# Patient Record
Sex: FEMALE | Race: WHITE | URBAN - METROPOLITAN AREA
[De-identification: names, ages, dates, MRNs, and addresses within clinical notes are randomized per-mention and may not be internally consistent; named-entity substitution may affect disease eponyms.]

---

## 2021-09-20 ENCOUNTER — NURSE TRIAGE (OUTPATIENT)
Dept: OTHER | Facility: OTHER | Age: 31
End: 2021-09-20

## 2021-09-21 NOTE — TELEPHONE ENCOUNTER
Regarding: Travel Swab  ----- Message from [facility] sent at 9/20/2021  7:03 PM EDT -----  "I need a covid swab for a work training I need to go to "

## 2022-11-04 ENCOUNTER — ULTRASOUND (OUTPATIENT)
Dept: OBGYN CLINIC | Facility: MEDICAL CENTER | Age: 32
End: 2022-11-04

## 2022-11-04 VITALS
WEIGHT: 165.4 LBS | BODY MASS INDEX: 28.24 KG/M2 | DIASTOLIC BLOOD PRESSURE: 72 MMHG | SYSTOLIC BLOOD PRESSURE: 124 MMHG | HEIGHT: 64 IN

## 2022-11-04 DIAGNOSIS — Z98.891 HISTORY OF 3 CESAREAN SECTIONS: ICD-10-CM

## 2022-11-04 DIAGNOSIS — Q51.28 UTERUS DIDELPHYS IN PREGNANCY, FIRST TRIMESTER: ICD-10-CM

## 2022-11-04 DIAGNOSIS — O34.591 UTERUS DIDELPHYS IN PREGNANCY, FIRST TRIMESTER: ICD-10-CM

## 2022-11-04 DIAGNOSIS — N91.1 SECONDARY AMENORRHEA: Primary | ICD-10-CM

## 2022-11-04 NOTE — PROGRESS NOTES
Assessment/Plan:      28 y o  [id number] at  8 3/7 wga with RYLEE of 22 by US today  Heart tones WNL  Pt to follow up for OB intake  She is not taking any teratogenic medications  Nausea/vomiting is minimal  Counseled to start prenatal vitamin  Didelphys uterus w/ h/o  x 3  Subjective:      Patient ID:      28 y o  [id number] -    Patient is here for an early ultrasound  This was a planned pregnancy  Denies bleeding or cramping  Has some nausea  LMP: unknown  RYLEE: 23   GA: 9W4D           Review of Systems   Constitutional: Negative  Respiratory: Negative  Genitourinary: Negative  Psychiatric/Behavioral: Negative  Objective:      Physical Exam   Constitutional: She appears well-nourished  Cardiovascular: Normal rate  Pulmonary/Chest: Effort normal           Transvaginal US shows a live fetus with a heart beat of   Crown-rump length measures 2 55 cm, consistent with 9 2/7 wga  A yolk sac is visible within the gestational sac   Appears to be in left uterus

## 2022-11-17 ENCOUNTER — INITIAL PRENATAL (OUTPATIENT)
Dept: OBGYN CLINIC | Facility: CLINIC | Age: 32
End: 2022-11-17

## 2022-11-17 DIAGNOSIS — Z34.81 PRENATAL CARE, SUBSEQUENT PREGNANCY, FIRST TRIMESTER: Primary | ICD-10-CM

## 2022-11-17 NOTE — PROGRESS NOTES
OB INTAKE INTERVIEW  Patient is 28 y o  who presents for OB intake at 11-3 wks  She is accompanied by: phone interview  The father of her baby Gianfranco Noriega) is involved in the pregnancy and is supportive    Last Menstrual Period: 22  Ultrasound: Measured 9 weeks 2 days on 22  Estimated Date of Delivery: 2023 via LMP     Signs/Symptoms of Pregnancy  Current pregnancy symptoms: N & V  Constipation no  Headaches no  Cramping/spotting no  PICA cravings no    Diabetes-    If patient has 1 or more, please order early 1 hour GTT  History of GDM no  BMI >35 no  History of PCOS or current metformin use no  History of LGA/macrosomic infant (4000g/9lbs) no    If patient has 2 or more, please order early 1 hour GTT  BMI>30 no  AMA no  First degree relative with type 2 diabetes no  History of chronic HTN, hyperlipidemia, elevated A1C no  High risk race (, , ,  or ) no    Hypertension- if you answer yes, please order preeclampsia labs (cbc, comprehensive metabolic panel, urine protein creatinine ratio, uric acid)  History of of chronic HTN no  History of gestational HTN no  History of preeclampsia, eclampsia, or HELLP syndrome no  History of diabetes no  History of lupus, autoimmune disease, kidney disease no    Thyroid- if yes order TSH with reflex T4  History of thyroid disease no    Bleeding Disorder or Hx of DVT-patient or first degree relative with history of  Order the following if not done previously     (Factor V, antithrombin III, prothrombin gene mutation, protein C and S Ag, lupus anticoagulant, anticardiolipin, beta-2 glycoprotein)   no    OB/GYN-  History of abnormal pap smear no  History of HPV no  History of Herpes/HSV no  History of other STI (gonorrhea, chlamydia, trich) no  History of prior  no  History of prior  YES  History of  delivery prior to 36 weeks 6 days YES  History of blood transfusion no  Ok for blood transfusion yes    Substance screening- if yes outside of tobacco for her or anyone in her home-order urine drug screen  History of tobacco use YES  Currently using tobacco no  Currently using alcohol no  Presently using drugs no  Past drug use  no  IV drug use-If yes add Hep C antibody to labs no  Partner drug use no  Parent/Family drug use no    MRSA Screening-   Does the pt have a hx of MRSA? no  If yes- please follow MRSA protocol and obtain a nasal swab for MRSA culture    Immunizations:  Influenza vaccine given this season no  Discussed Tdap vaccine yes  Discussed COVID Vaccine yes    Genetic/MFM-  Do you or your partner have a history of any of the following in yourselves or first degree relatives? Cystic fibrosis no  Spinal muscular atrophy no  Hemoglobinopathy/Sickle Cell/Thalassemia no  Fragile X Intellectual Disability no    If yes, discuss carrier screening and recommend consultation with [facility]/genetic counseling  If no, discuss option for carrier screening and/or genetic testing with Nuchal Ultras[facility]  Patient interested yes  Appointment at [facility] made yes    Interview education  [facility] Pregnancy Essentials Book reviewed and discussed yes    Nurse/Family Partnership- patient may qualify no; referral placed no    Prenatal lab work scripts yes  Extra labs ordered:  no    The patient has a history now or in prior pregnancy notable for:   3 prior C/S  2 term, 1  at 33-3 -(IUGR),  Didelphys Uterus,  BMI 28 6  Prior deliveries at [facility]  Details that I feel the provider should be aware of: Pt denies receiving a flu vaccine or Covid Vaccines   Discussed importance of receiving both  PN1 visit scheduled  The patient was oriented to our practice, reviewed delivering physicians and [facility] for Delivery  All questions were answered  PN phone interview completed  Pt happy with pregnancy  PN bldwk ordered in epic    Encouraged pt to have bldwk drawn prior to PN1 visit, scheduled for 12/8/22  Referral was entered for [facility]- pt has appts scheduled for 12/1/22 & 1/24/23  Advised to call with any further questions/concerns      Interviewed by: Charanjit Hamilton RN, [address] [place] [address]

## 2022-12-15 PROBLEM — Z3A.15 15 WEEKS GESTATION OF PREGNANCY: Status: ACTIVE | Noted: 2022-12-15

## 2022-12-15 PROBLEM — O34.219 PREVIOUS CESAREAN DELIVERY, ANTEPARTUM: Status: ACTIVE | Noted: 2022-11-04

## 2022-12-15 PROBLEM — O09.219 PREVIOUS PRETERM DELIVERY, ANTEPARTUM: Status: ACTIVE | Noted: 2022-12-15

## 2022-12-15 NOTE — PROGRESS NOTES
Prenatal Visit  Subjective:   Rashid Robin is a 28 y o  female  She is a [id number] here for initial PN visit/New OB exam    She is reporting the following pregnancy related complaints:  • NO N/V  • Denies cramping or VB      The following portions of the patient's history were reviewed and updated as appropriate: allergies, current medications, past family history, past medical history, past social history, past surgical history, and problem list   Genetic Family hx: unremarkle  Diabetes risk Factors:   none     Hypertension Risk Factors:   Pertinent positive: none      Objective:  Patient's last menstrual period was 2022 (exact date)  /82 (BP Location: Left arm, Patient Position: Sitting, Cuff Size: Standard)   Ht 5' 3 5" (1 613 m)   Wt 75 1 kg (165 lb 9 6 oz)   LMP 2022 (Exact Date)   BMI 28 87 kg/m²   Pregravid Weight/BMI: 77 1 kg (170 lb) (BMI 29 64)      OBGyn Exam- see prenatal physical form    Assessment & Plan:    1  Previous  delivery, antepartum  Assessment & Plan:  Previous C/S x3  Planning repeat C/S at 39 wks    Orders:  -     POCT urine dip    2  Previous  delivery, antepartum  Assessment & Plan:  Pt with prior PTD at 33w3d for IUGR  MFM consult   3  Screen for STD (sexually transmitted disease)  -     Chlamydia/GC amplified DNA by PCR    4  Screening for cervical cancer  -     Liquid-based pap, screening    5  15 weeks gestation of pregnancy  Assessment & Plan:  She is a [id number] at 15w4d   Viability previously established  + FHR today  New OB Exam completed -and Essentially normal- Has 2 cervix due to didelphic uterus  Pap is indicated x2 and gonorrhea/chlamydia cultures collected  See other A&P for risk factors/identified    I reviewed the expected course of the pregnancy with visits, labs and additional testing  The patient has not obtained her prenatal labs    Genetic screening/testing options again reviewed and she elects for average risk NIPT     I have reviewed the maternal as well as infant benefits of breastfeeding with the patient  The patient currently plans to bottle feed  I have reviewed proper nutrition in pregnancy as well as exercise and safe medications that can be used for various common symptoms in pregnancy  I reviewed the reasons to call in the first trimester - namely vaginal bleeding, severe nausea and vomiting, severe pelvic pain, fevers or pain/burning with urination  She was already previously referred to [facility] for NT US and will scheduled for level 2 G&A for 20-21 wks after that appt  I recommended that the patient receive the covid vaccine if not already done and to receive the flu vaccine during flu season  AFP order given today  All questions were answered to her satisfaction      Orders:  -     Alpha fetoprotein, maternal; Future        ALCIDES Haas  12/16/2022

## 2022-12-16 ENCOUNTER — INITIAL PRENATAL (OUTPATIENT)
Dept: OBGYN CLINIC | Facility: MEDICAL CENTER | Age: 32
End: 2022-12-16

## 2022-12-16 VITALS
SYSTOLIC BLOOD PRESSURE: 132 MMHG | WEIGHT: 165.6 LBS | DIASTOLIC BLOOD PRESSURE: 82 MMHG | BODY MASS INDEX: 28.27 KG/M2 | HEIGHT: 64 IN

## 2022-12-16 DIAGNOSIS — Z3A.15 15 WEEKS GESTATION OF PREGNANCY: ICD-10-CM

## 2022-12-16 DIAGNOSIS — O09.219 PREVIOUS PRETERM DELIVERY, ANTEPARTUM: ICD-10-CM

## 2022-12-16 DIAGNOSIS — Z11.3 SCREEN FOR STD (SEXUALLY TRANSMITTED DISEASE): ICD-10-CM

## 2022-12-16 DIAGNOSIS — Z12.4 SCREENING FOR CERVICAL CANCER: ICD-10-CM

## 2022-12-16 DIAGNOSIS — O34.592 UTERUS DIDELPHYS IN PREGNANCY, SECOND TRIMESTER: ICD-10-CM

## 2022-12-16 DIAGNOSIS — Q51.28 UTERUS DIDELPHYS IN PREGNANCY, SECOND TRIMESTER: ICD-10-CM

## 2022-12-16 DIAGNOSIS — O34.219 PREVIOUS CESAREAN DELIVERY, ANTEPARTUM: Primary | ICD-10-CM

## 2022-12-16 LAB
SL AMB  POCT GLUCOSE, UA: ABNORMAL
SL AMB POCT URINE PROTEIN: ABNORMAL

## 2022-12-16 NOTE — PROGRESS NOTES
Initial Prenatal Visit   GA 15+2/7  [id number]  Pap - 10/10/17  Patient denies any lof, vb or ctx  Patient has not felt FM yet  Patient urine is trace pro/neg glu   Blue folder given/reviewed today   Patient has no concerns today

## 2022-12-16 NOTE — ASSESSMENT & PLAN NOTE
She is a [id number] at 15w4d   Viability previously established  + FHR today  New OB Exam completed -and Essentially normal- Has 2 cervix due to didelphic uterus  Pap is indicated x2 and gonorrhea/chlamydia cultures collected  See other A&P for risk factors/identified    I reviewed the expected course of the pregnancy with visits, labs and additional testing  The patient has not obtained her prenatal labs  Genetic screening/testing options again reviewed and she elects for average risk NIPT     I have reviewed the maternal as well as infant benefits of breastfeeding with the patient  The patient currently plans to bottle feed  I have reviewed proper nutrition in pregnancy as well as exercise and safe medications that can be used for various common symptoms in pregnancy  I reviewed the reasons to call in the first trimester - namely vaginal bleeding, severe nausea and vomiting, severe pelvic pain, fevers or pain/burning with urination  She was already previously referred to [facility] for NT US and will scheduled for level 2 G&A for 20-21 wks after that appt  I recommended that the patient receive the covid vaccine if not already done and to receive the flu vaccine during flu season  AFP order given today  All questions were answered to her satisfaction

## 2022-12-19 LAB
HPV HR 12 DNA CVX QL NAA+PROBE: NEGATIVE
HPV16 DNA CVX QL NAA+PROBE: NEGATIVE
HPV18 DNA CVX QL NAA+PROBE: POSITIVE

## 2022-12-20 PROBLEM — A63.0 HPV (HUMAN PAPILLOMA VIRUS) ANOGENITAL INFECTION: Status: ACTIVE | Noted: 2022-12-20

## 2022-12-20 LAB
C TRACH DNA SPEC QL NAA+PROBE: NEGATIVE
N GONORRHOEA DNA SPEC QL NAA+PROBE: NEGATIVE

## 2022-12-23 LAB
LAB AP GYN PRIMARY INTERPRETATION: NORMAL
Lab: NORMAL

## 2022-12-27 PROBLEM — Q51.28 DIDELPHIC UTERUS IN PREGNANCY: Status: ACTIVE | Noted: 2022-12-27

## 2022-12-27 PROBLEM — O34.599 DIDELPHIC UTERUS IN PREGNANCY: Status: ACTIVE | Noted: 2022-12-27

## 2022-12-27 LAB
LAB AP GYN PRIMARY INTERPRETATION: ABNORMAL
Lab: ABNORMAL
PATH INTERP SPEC-IMP: ABNORMAL

## 2022-12-28 ENCOUNTER — TELEPHONE (OUTPATIENT)
Dept: OBGYN CLINIC | Facility: CLINIC | Age: 32
End: 2022-12-28

## 2022-12-28 NOTE — TELEPHONE ENCOUNTER
Pt needs colp and  added another 20 min to your appt - hopefully you can do colp at appt   Please check - if not good - needs to be r/s

## 2022-12-28 NOTE — TELEPHONE ENCOUNTER
----- Message from Tariq Centeno [place] sent at 12/27/2022  3:52 PM EST -----  Didelphic uterus/cervix  Abnormal right cervix sample  ASCUS with + Type 18 HPV  Needs OB colpo   Please notify and schedule her

## 2023-01-13 PROBLEM — Z3A.19 19 WEEKS GESTATION OF PREGNANCY: Status: ACTIVE | Noted: 2022-12-15

## 2023-01-25 PROBLEM — O09.292 HISTORY OF IUGR (INTRAUTERINE GROWTH RETARDATION) AND STILLBIRTH, CURRENTLY PREGNANT, SECOND TRIMESTER: Status: ACTIVE | Noted: 2023-01-25

## 2023-01-25 NOTE — PATIENT INSTRUCTIONS
Thank you for choosing us for your  care today  If you have any questions about your ultrasound or care, please do not hesitate to contact us or your primary obstetrician  Some general instructions for your pregnancy are:    Exercise: Aim for 22 minutes per day (150 minutes per week) of regular exercise  Walking is great! Nutrition: Choose healthy sources of calcium, iron, and protein  Learn about Preeclampsia: preeclampsia is a common, serious high blood pressure complication in pregnancy  A blood pressure of 326KVTL (systolic or top number) or 53NDKI (diastolic or bottom number) is not normal and needs evaluation by your doctor  Aspirin is sometimes prescribed in early pregnancy to prevent preeclampsia in women with risk factors - ask your obstetrician if you should be on this medication  For more resources, visit:  https://mothertobaby org/fact-sheets/low-dose-aspirin/  PlannerBlog com cy  https://www highriskpregnancyinfo org/preeclampsia  If you smoke, try to reduce how many cigarettes you smoke or try to quit completely  Do not vape  Other warning signs to watch out for in pregnancy or postpartum: chest pain, obstructed breathing or shortness of breath, seizures, thoughts of hurting yourself or your baby, bleeding, a painful or swollen leg, fever, or headache (see [facility] POST-BIRTH Warning Signs campaign)  If these happen call 911  Itching is also not normal in pregnancy and if you experience this, especially over your hands and feet, potentially worse at night, notify your doctors

## 2023-01-26 ENCOUNTER — ROUTINE PRENATAL (OUTPATIENT)
Dept: PERINATAL CARE | Facility: OTHER | Age: 33
End: 2023-01-26

## 2023-01-26 VITALS
BODY MASS INDEX: 30.79 KG/M2 | HEIGHT: 63 IN | WEIGHT: 173.8 LBS | SYSTOLIC BLOOD PRESSURE: 120 MMHG | DIASTOLIC BLOOD PRESSURE: 70 MMHG | HEART RATE: 90 BPM

## 2023-01-26 DIAGNOSIS — Z36.3 ENCOUNTER FOR ANTENATAL SCREENING FOR MALFORMATIONS: ICD-10-CM

## 2023-01-26 DIAGNOSIS — N91.1 SECONDARY AMENORRHEA: ICD-10-CM

## 2023-01-26 DIAGNOSIS — Z36.86 ENCOUNTER FOR ANTENATAL SCREENING FOR CERVICAL LENGTH: ICD-10-CM

## 2023-01-26 DIAGNOSIS — Q51.28 PREGNANCY WITH CONGENITAL DUPLICATION OF UTERUS IN SECOND TRIMESTER: Primary | ICD-10-CM

## 2023-01-26 DIAGNOSIS — O34.592 PREGNANCY WITH CONGENITAL DUPLICATION OF UTERUS IN SECOND TRIMESTER: Primary | ICD-10-CM

## 2023-01-26 DIAGNOSIS — Z3A.21 21 WEEKS GESTATION OF PREGNANCY: ICD-10-CM

## 2023-01-26 DIAGNOSIS — O09.292 HISTORY OF IUGR (INTRAUTERINE GROWTH RETARDATION) AND STILLBIRTH, CURRENTLY PREGNANT, SECOND TRIMESTER: ICD-10-CM

## 2023-01-26 DIAGNOSIS — O34.219 PREVIOUS CESAREAN DELIVERY, ANTEPARTUM: ICD-10-CM

## 2023-01-26 NOTE — LETTER
2023     [address]  [address] [address]    Patient: Ute Mendez   YOB: 1990   Date of Visit: 2023       Dear Dr Zack Coon: Thank you for referring Dar Valerio to me for evaluation  Below are my notes for this consultation  If you have questions, please do not hesitate to call me  I look forward to following your patient along with you  Sincerely,        Patricia Tejeda MD        CC: No Recipients  Patricia Tejeda MD  2023  4:47 PM  Sign when Signing Visit  Stacey Bennett Md  [address] [facility],  [address] [facility]     Dear Dr Zack Coon     Thank you for requesting a  consultation on your patient Roma Manzo for the following indications: Fetal anatomical survey and she declined genetic screening  History  Past medical history: Uterine didelphys, migraines  Past surgical history:  section x3  Medications: Prenatal vitamins  Allergies to medications: None  Past obstetrical history:   2013 at 39 weeks and 2 days she required a low transverse  girl who was small for gestational age  2017 at 33 weeks and 3 days she required a low transverse  section for a 2 pound 15 ounce baby boy  During this pregnancy she fell and during the monitoring she was found to have a category 2 tracing and an US showed her babies growth was lagging by 5 weeks so she underwent steroids to help with lung maturity and delivery was planned 48 hours later  2018 at 39 weeks and 2 days she had a 6 pound 6 ounce baby girl by low-transverse  section  Social history: Denies substance use currently    She has a history of smoking in the past   First generation family history: No evidence for hypertension, diabetes, thrombosis or congenital anomalies reported    Ultrasound findings: Her ultrasound shows a fetus in the uterus with a septum and the fetus is lying to the left of the septum  The fetal growth is concordant with her dates  No malformations are detected  We were unable to complete the anatomical survey today secondary to fetal position  the majority of the placenta is on the posterior uterine wall with a small portion on the uterine septum  The placental cord insertion is difficult to see today secondary to fetal position and placental position  Transvaginal scan of her cervical length shows that the 2 cervixes are very close to 1 another and is difficult to tell which is the one that is associated with the left uterus so we measured both cervixes and they both are normal in length  The patient was informed of the findings and counseled about the limitations of the exam in detecting all forms of fetal congenital abnormalities  She does not report any vaginal bleeding or uterine cramping/contractions  She does feel fetal movement  Pelvic exam by myself shows that she has what feels like two cervixes immediately [id number]  The cervix appears wide with 2 dimples suggestive of 2 external os  Specific counseling was provided on the following problems:  1  Uterine malformations can be associated with IUGR,  labor and fetal malpresentation  With her history of prior pregnancies that were small for gestational age recommend serial growth scans starting at 28 weeks  2   Offered genetic screening again today which she declined  Follow up recommended:   Recommend a growth scan at 28 weeks  Pre visit time reviewing her records  15 minutes  Face to face time 20 minutes  Post visit time on documentation of note, updating her problem list, adding orders and prescriptions 20 minutes  Procedures that were completed today were charged separately  The level of decision making was moderate complexity      Caleb Ramirez MD

## 2023-01-26 NOTE — PROGRESS NOTES
Raymond Pham Md  [address] [id number]  [address] [facility],  [address] [facility]     Dear Dr Cristin Colón     Thank you for requesting a  consultation on your patient Roma Barrios for the following indications: Fetal anatomical survey and she declined genetic screening  History  Past medical history: Uterine didelphys, migraines  Past surgical history:  section x3  Medications: Prenatal vitamins  Allergies to medications: None  Past obstetrical history:   2013 at 39 weeks and 2 days she required a low transverse  girl who was small for gestational age  2017 at 33 weeks and 3 days she required a low transverse  section for a 2 pound 15 ounce baby boy  During this pregnancy she fell and during the monitoring she was found to have a category 2 tracing and an US showed her babies growth was lagging by 5 weeks so she underwent steroids to help with lung maturity and delivery was planned 48 hours later  2018 at 39 weeks and 2 days she had a 6 pound 6 ounce baby girl by low-transverse  section  Social history: Denies substance use currently  She has a history of smoking in the past   First generation family history: No evidence for hypertension, diabetes, thrombosis or congenital anomalies reported    Ultrasound findings: Her ultrasound shows a fetus in the uterus with a septum and the fetus is lying to the left of the septum  The fetal growth is concordant with her dates  No malformations are detected  We were unable to complete the anatomical survey today secondary to fetal position  the majority of the placenta is on the posterior uterine wall with a small portion on the uterine septum  The placental cord insertion is difficult to see today secondary to fetal position and placental position    Transvaginal scan of her cervical length shows that the 2 cervixes are very close to 1 another and is difficult to tell which is the one that is associated with the left uterus so we measured both cervixes and they both are normal in length  The patient was informed of the findings and counseled about the limitations of the exam in detecting all forms of fetal congenital abnormalities  She does not report any vaginal bleeding or uterine cramping/contractions  She does feel fetal movement  Pelvic exam by myself shows that she has what feels like two cervixes immediately [id number]  The cervix appears wide with 2 dimples suggestive of 2 external os  Specific counseling was provided on the following problems:  1  Uterine malformations can be associated with IUGR,  labor and fetal malpresentation  With her history of prior pregnancies that were small for gestational age recommend serial growth scans starting at 28 weeks  2   Offered genetic screening again today which she declined  Follow up recommended:   Recommend a growth scan at 28 weeks  Pre visit time reviewing her records  15 minutes  Face to face time 20 minutes  Post visit time on documentation of note, updating her problem list, adding orders and prescriptions 20 minutes  Procedures that were completed today were charged separately  The level of decision making was moderate complexity      Caleb Ramirez MD

## 2023-01-27 ENCOUNTER — ROUTINE PRENATAL (OUTPATIENT)
Dept: OBGYN CLINIC | Facility: MEDICAL CENTER | Age: 33
End: 2023-01-27

## 2023-01-27 ENCOUNTER — TELEPHONE (OUTPATIENT)
Dept: PERINATAL CARE | Facility: OTHER | Age: 33
End: 2023-01-27

## 2023-01-27 VITALS
WEIGHT: 171.4 LBS | HEIGHT: 63 IN | SYSTOLIC BLOOD PRESSURE: 120 MMHG | DIASTOLIC BLOOD PRESSURE: 70 MMHG | BODY MASS INDEX: 30.37 KG/M2

## 2023-01-27 DIAGNOSIS — A63.0 HPV (HUMAN PAPILLOMA VIRUS) ANOGENITAL INFECTION: ICD-10-CM

## 2023-01-27 DIAGNOSIS — O09.219 PREVIOUS PRETERM DELIVERY, ANTEPARTUM: ICD-10-CM

## 2023-01-27 DIAGNOSIS — Z3A.21 21 WEEKS GESTATION OF PREGNANCY: Primary | ICD-10-CM

## 2023-01-27 DIAGNOSIS — Q51.28 PREGNANCY WITH CONGENITAL DUPLICATION OF UTERUS IN SECOND TRIMESTER: ICD-10-CM

## 2023-01-27 DIAGNOSIS — O34.592 PREGNANCY WITH CONGENITAL DUPLICATION OF UTERUS IN SECOND TRIMESTER: ICD-10-CM

## 2023-01-27 LAB
SL AMB  POCT GLUCOSE, UA: ABNORMAL
SL AMB POCT URINE PROTEIN: ABNORMAL

## 2023-01-27 NOTE — PROGRESS NOTES
Prenatal Visit  Subjective:   Lisa Saleem is a 28 y o  [id number] 21w4d here for Routine Prenatal Visit (Pt is here for routine PN visit /No lof,VB,Ctx,/+FTM/Urine-Trace protein, Neg glucose /No questions or concerns for today //)    She denies any cramping, LOF/unusual discharge or VB  She has noted fetal movement  I have reviewed the patient's Level II u/s which was normal     Pregnancy: Fink    Objective:  Vitals:    23 1111   BP: 120/70     Pregravid Weight/BMI: 77 1 kg (170 lb) (BMI 30 12)  Current Weight: 77 7 kg (171 lb 6 4 oz)   Total Weight Gain: 0 635 kg (1 lb 6 4 oz)     Fetal Heart Rate: 150    OBGyn Exam  Physical Exam  Fetal Heart Rate: 150   General: Not in acute distress and appearing well nourished and well groomed  Genitourinary:          Pelvic exam exam deferred   Cardiovascular:   Rate and Rhythm: Normal rate  Pulmonary:    Effort: normal, not labored  Abdominal:  Abdomen is soft and gravid    Musculoskeletal: Active movement of all extremities, no gross limitations of ROM     Edema:None noted  Neurological:   Mental Status: She is alert and oriented to person, place, and time  Skin: General: Skin is warm and dry  Psychiatric:    Mood and Affect: Mood normal       Behavior: Behavior normal    Assessment & Plan:    1  21 weeks gestation of pregnancy  Assessment & Plan:    21w4d  Doing well  Starting to feel fetal movement  Level 2 US WNL    Education provided on the pros/cons breastfeeding vs formula feeding; in particular the effects of early supplementation and education provided in AVS as well     Questions have been answered to her satisfaction  Orders:  -     POCT urine dip    2  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

## 2023-01-27 NOTE — ASSESSMENT & PLAN NOTE
21w4d  Doing well  Starting to feel fetal movement  Level 2 US WNL    Education provided on the pros/cons breastfeeding vs formula feeding; in particular the effects of early supplementation and education provided in AVS as well     Questions have been answered to her satisfaction

## 2023-01-27 NOTE — TELEPHONE ENCOUNTER
LVM 1/27 at 0905 to sched  # provided to patient    ----- Message from Ricky Francse MD sent at 1/26/2023  4:48 PM EST -----  Please call to schedule this patient for a 28 week growth scan       Lisa Ryan

## 2023-02-09 ENCOUNTER — ROUTINE PRENATAL (OUTPATIENT)
Dept: OBGYN CLINIC | Facility: MEDICAL CENTER | Age: 33
End: 2023-02-09

## 2023-02-09 VITALS
HEART RATE: 74 BPM | WEIGHT: 176 LBS | DIASTOLIC BLOOD PRESSURE: 74 MMHG | SYSTOLIC BLOOD PRESSURE: 128 MMHG | BODY MASS INDEX: 31.18 KG/M2

## 2023-02-09 DIAGNOSIS — Z34.82 PRENATAL CARE, SUBSEQUENT PREGNANCY, SECOND TRIMESTER: ICD-10-CM

## 2023-02-09 DIAGNOSIS — Z3A.23 23 WEEKS GESTATION OF PREGNANCY: Primary | ICD-10-CM

## 2023-02-09 DIAGNOSIS — O09.292 HISTORY OF IUGR (INTRAUTERINE GROWTH RETARDATION) AND STILLBIRTH, CURRENTLY PREGNANT, SECOND TRIMESTER: ICD-10-CM

## 2023-02-09 DIAGNOSIS — O34.592 PREGNANCY WITH CONGENITAL DUPLICATION OF UTERUS IN SECOND TRIMESTER: ICD-10-CM

## 2023-02-09 DIAGNOSIS — Q51.28 PREGNANCY WITH CONGENITAL DUPLICATION OF UTERUS IN SECOND TRIMESTER: ICD-10-CM

## 2023-02-09 DIAGNOSIS — A63.0 HPV (HUMAN PAPILLOMA VIRUS) ANOGENITAL INFECTION: ICD-10-CM

## 2023-02-09 DIAGNOSIS — O34.219 PREVIOUS CESAREAN DELIVERY, ANTEPARTUM: ICD-10-CM

## 2023-02-09 LAB
SL AMB  POCT GLUCOSE, UA: NORMAL
SL AMB POCT URINE PROTEIN: NORMAL

## 2023-02-09 NOTE — ASSESSMENT & PLAN NOTE
Carley Hein  is a 28 y o  [id number] @23w3d who presents for routine prenatal visit  Denies OB complaints  NIPT/MASFP- declined  Level II - completed; 28 wk growth scan needed; pt aware to call to schedule this  28 week lab orders placed today; test timing reviewed    Good fetal movement  Denies contractions, cramping, leakage of fluid or vaginal bleeding  Reviewed PTL precautions and reasons to call

## 2023-02-09 NOTE — PROGRESS NOTES
Routine OB 23w3d  Overall feels well  Denies any loss of fluid, bleeding or  regular contractions  She confirms good fetal movement  Urine Dip NEG for glucose and NEG for protein  Concerns for today none to offer  Reviewed orders for 3rd trimester labs

## 2023-02-09 NOTE — PROGRESS NOTES
23 weeks gestation of pregnancy  Devin Ibarra  is a 28 y o  [id number] @23w3d who presents for routine prenatal visit  Denies OB complaints  NIPT/MASFP- declined  Level II - completed; 28 wk growth scan needed; pt aware to call to schedule this  28 week lab orders placed today; test timing reviewed    Good fetal movement  Denies contractions, cramping, leakage of fluid or vaginal bleeding  Reviewed PTL precautions and reasons to call          History of IUGR (intrauterine growth retardation) and stillbirth, currently pregnant, second trimester  28 wk then serial growth recommended by MFM  Pt advised to call MFM to schedule    HPV type 18  OB colpo scheduled for next week    Previous  delivery, antepartum  Hx CS x 3  Will need repeat    Didelphic uterus in pregnancy  28 wk then serial growth recommended by MF  Pt advised to call MFM to schedule

## 2023-02-14 ENCOUNTER — PROCEDURE VISIT (OUTPATIENT)
Dept: OBGYN CLINIC | Facility: MEDICAL CENTER | Age: 33
End: 2023-02-14

## 2023-02-14 VITALS
HEIGHT: 63 IN | DIASTOLIC BLOOD PRESSURE: 76 MMHG | BODY MASS INDEX: 31.57 KG/M2 | SYSTOLIC BLOOD PRESSURE: 132 MMHG | WEIGHT: 178.2 LBS

## 2023-02-14 DIAGNOSIS — R87.610 ASCUS WITH POSITIVE HIGH RISK HPV CERVICAL: Primary | ICD-10-CM

## 2023-02-14 DIAGNOSIS — R87.810 ASCUS WITH POSITIVE HIGH RISK HPV CERVICAL: Primary | ICD-10-CM

## 2023-02-14 NOTE — PROGRESS NOTES
Colposcopy     Date/Time 2/14/2023 3:16 PM     Universal Protocol   Consent: Verbal consent obtained  Risks and benefits: risks, benefits and alternatives were discussed  Consent given by: patient  Patient understanding: patient states understanding of the procedure being performed  Patient identity confirmed: verbally with patient       Performed by  Shanna Almaraz MD     Authorized by Shanna Almaraz MD        Pre-procedure details     Prepped with: acetic acid       Indication    ASC-US (HPV 18 positive)     Procedure Details   Procedure: Colposcopy w/ cervical biopsy and ECC      [place] speculum was placed in the vagina: yes      Under colposcopic examination the transition zone was seen in entirety: yes      Cervical biopsy performed with a cervical biopsy punch: yes      Monsel's solution was applied: yes      Biopsy(s): yes      Location:  7 o clock    Specimen to pathology: yes       Post-procedure      Findings: Mosaicism and White epithelium      Impression: Low grade cervical dysplasia      Patient tolerance of procedure: Tolerated well, no immediate complications     Comments       Known didelphys uterus, ASCUS, 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

## 2023-02-28 ENCOUNTER — NURSE TRIAGE (OUTPATIENT)
Dept: OTHER | Facility: OTHER | Age: 33
End: 2023-02-28

## 2023-02-28 ENCOUNTER — HOSPITAL ENCOUNTER (OUTPATIENT)
Facility: HOSPITAL | Age: 33
Discharge: HOME | End: 2023-02-28
Attending: OBSTETRICS & GYNECOLOGY | Admitting: OBSTETRICS & GYNECOLOGY

## 2023-02-28 VITALS
TEMPERATURE: 99.4 F | RESPIRATION RATE: 18 BRPM | SYSTOLIC BLOOD PRESSURE: 122 MMHG | OXYGEN SATURATION: 98 % | DIASTOLIC BLOOD PRESSURE: 73 MMHG | HEART RATE: 99 BPM

## 2023-02-28 PROBLEM — Z3A.26 26 WEEKS GESTATION OF PREGNANCY: Status: ACTIVE | Noted: 2022-12-15

## 2023-02-28 PROBLEM — N89.8 VAGINAL DISCHARGE: Status: ACTIVE | Noted: 2023-02-28

## 2023-02-28 NOTE — TELEPHONE ENCOUNTER
Reason for Disposition  • [1] Pregnant 24-36 weeks () AND [2] pinkish or brownish mucous discharge    Answer Assessment - Initial Assessment Questions  1  DISCHARGE: "Describe the discharge " (e g , white, yellow, green, gray, foamy, cottage cheese-like)     Brownish colored mucus ball   2  ODOR: "Is there a bad odor?"      Denies  3  ONSET: "When did the discharge begin?"      Just happened  a few mins ago  4  RASH: "Is there a rash in that area?" If Yes, ask: "Describe it " (e g , redness, blisters, sores, bumps)      Denies  5  ABDOMINAL PAIN: "Are you having any abdominal pain?" If Yes, ask: "What does it feel like?" (e g , crampy, dull, intermittent, constant)       Denies  ABDOMINAL PAIN SEVERITY: If present, ask: "How bad is it?"  (e g , Scale 1-10; mild, moderate, or severe)    - MILD (1-3): doesn't interfere with normal activities, abdomen soft and not tender to touch     - MODERATE (4-7): interferes with normal activities or awakens from sleep, tender to touch     - SEVERE (8-10): excruciating pain, doubled over, unable to do any normal activities      Denies  7  CAUSE: "What do you think is causing the discharge?"      Unsure  8  OTHER SYMPTOMS: "Do you have any other symptoms?" (e g , fever, itching, vaginal bleeding, pain with urination)      Denies  9  RYLEE: "What date are you expecting to deliver?"      06 6 23  10   PREGNANCY: "How many weeks pregnant are you?"    Protocols used: PREGNANCY - VAGINAL DISCHARGE-ADULT-

## 2023-02-28 NOTE — PROCEDURES
Carlos Alberto Destinee [facility] [id number] at 26w1d with an RYLEE of 6/5/2023, by Last Menstrual Period, was seen at [address] for the following procedure(s): $Procedure Type: US - Transvaginal]            Image failing to upload to note but present in media tab  Ultrasound Other  Fetal Presentation: Vertex  Cervical Length: 3 27  Funnel: No  Placenta Previa: No  Vasa Previa: No         Ultrasound Probe Disinfection    A transvaginal ultrasound was performed     Prior to use, disinfection was performed with High Level Disinfection Process (Trophon)      Negin Varghese MD  02/28/23  12:13 PM

## 2023-02-28 NOTE — TELEPHONE ENCOUNTER
Regarding: Mucus Ball  ----- Message from [facility] sent at 2/28/2023  6:24 AM EST -----  "I am 26w and I just went to the bathroom and had a huge mucus ball just come out   Should I go to the ER or can I wait to be seen?"

## 2023-02-28 NOTE — PROGRESS NOTES
L&D Triage Note - OB/GYN  Jean Cheung 28 y o  female MRN: [id number]  Unit/Bed#:  [facility]  Encounter: [id number]    Patient is seen by Isabell Santos is a 28 y o  [id number] at 26w1d here for vaginal discharge with a negative  labor work-up  PLAN  #1  Vaginal discharge   · Asymptomatic (no itching/burning/irritation/odor)  · Speculum exam and microscopy with normal findings  · Membrane status: no ferning or pooling and negative nitrazene   · Picture of discharge on media    #2  Pelvic Pressure    · Speculum exam and microscopy with normal findings  · CL 3 27 cm  · Left cervix (pregnancy in left uterine horn) 0/0/-4, firm and posterior  · Right cervix not palpable though definite vaginal septum palpable  · NST reactive  · No contractions noted on toco    Discharge instructions  · Patient instructed to call if experiencing worsening contractions, vaginal bleeding, loss of fluid or decreased fetal movement  · Will follow up with OBGYN on 2023     D/w Dr Lisa Lara  ______________    SUBJECTIVE    RYLEE: Estimated Date of Delivery: 23    HPI:  28 y o  [id number] 26w1d who reports that when she was wiping this morning she noticed abnormal vaginal discharge on her tissue  Of note patient had colposcopy with cervical biopsy and ECC 2 weeks ago for ASCUS and HPV 18 on the right cervix (she had known uterine didelphys with 2 cervices)  Since colposcopy patient, is reporting mild brown spotting but no gross hematuria or vaginal bleeding  Patient denies fever, contractions, dysuria, leakage of fluid or leakage of fluids  Patient reports feeling good fetal movement  Patient however is endorsing intermittent pelvic pressure       Her obstetrical history is significant for didelphic uterus, ASCUS and HPV 18     ROS:  Constitutional: Negative  Respiratory: Negative  Cardiovascular: Negative    Gastrointestinal: Negative    OBJECTIVE:  /73 (BP Location: Right arm)   Pulse 99   Temp 99 4 °F (37 4 °C) (Oral)   Resp 18   LMP 08/29/2022 (Exact Date)   SpO2 98%   There is no height or weight on file to calculate BMI  Physical Exam  Vitals and nursing note reviewed  Constitutional:       General: She is not in acute distress  Appearance: She is well-developed  HENT:      Head: Normocephalic and atraumatic  Eyes:      Conjunctiva/sclera: Conjunctivae normal    Cardiovascular:      Rate and Rhythm: Normal rate and regular rhythm  Heart sounds: No murmur heard  Pulmonary:      Effort: Pulmonary effort is normal  No respiratory distress  Breath sounds: Normal breath sounds  Abdominal:      General: There is no distension  Palpations: Abdomen is soft  Tenderness: There is no abdominal tenderness  There is no guarding  Musculoskeletal:         General: No swelling  Cervical back: Neck supple  Skin:     General: Skin is warm and dry  Capillary Refill: Capillary refill takes less than 2 seconds  Neurological:      Mental Status: She is alert  Psychiatric:         Mood and Affect: Mood normal          Speculum exam:  Normal external female genitalia  Cervix fully visualized and not visibly dilated  Physiologic discharge  No bleeding, pooling, abnormal discharge, or lesions noted    Microscopy:     Infection:   - neg clue cells    - neg  hyphae   - no trichomonads present    Membrane status   - no ferning   - no nitrazene   - no pooling     SVE:  0/0/-3, posterior, firm    FHT:  Baseline: 140 bpm  Variability: moderate  Accelerations: present  Decelerations: absent  NST reactive    TOCO:   Acontractile    IMAGING:       TVUS   Cervical length         - 3 27 cm         - 3 50 cm         - 3 60 cm   Presentation: 3 46    Labs: No results found for this or any previous visit (from the past 24 hour(s))        Yumiko Canseco MD  2/28/2023  10:46 AM

## 2023-02-28 NOTE — TELEPHONE ENCOUNTER
Pt called in stating she is 26 weeks pregnant with her 4th baby and noticed this morning while going to the bathroom that a large brownish colored glob came out  Pt had a colposcopy on 2 14 23  She denies any vaginal bleeding, leakage of fluid or abdominal pain/cramping  On call provider made aware    Per on call provider pt is to continue to monitor at home and to call back if anything changes  Pt agreed

## 2023-05-11 ENCOUNTER — TELEPHONE (OUTPATIENT)
Dept: OBGYN CLINIC | Facility: CLINIC | Age: 33
End: 2023-05-11

## 2023-06-27 ENCOUNTER — TELEPHONE (OUTPATIENT)
Dept: OBGYN CLINIC | Facility: CLINIC | Age: 33
End: 2023-06-27

## 2023-06-27 NOTE — TELEPHONE ENCOUNTER
Left message for patient on voicemail, asking to call back to let us know how she is doing and if she had delivered  Dewitte Sa is her partner and on medical consent left message for him looking for [employer] as well

## 2023-07-17 ENCOUNTER — TELEPHONE (OUTPATIENT)
Dept: OBGYN CLINIC | Facility: CLINIC | Age: 33
End: 2023-07-17

## 2023-07-26 ENCOUNTER — TELEPHONE (OUTPATIENT)
Dept: OBGYN CLINIC | Facility: CLINIC | Age: 33
End: 2023-07-26